# Patient Record
Sex: MALE | Race: WHITE | HISPANIC OR LATINO | Employment: FULL TIME | URBAN - METROPOLITAN AREA
[De-identification: names, ages, dates, MRNs, and addresses within clinical notes are randomized per-mention and may not be internally consistent; named-entity substitution may affect disease eponyms.]

---

## 2018-12-27 ENCOUNTER — HOSPITAL ENCOUNTER (EMERGENCY)
Facility: CLINIC | Age: 53
Discharge: HOME | End: 2018-12-27
Admitting: PHYSICIAN ASSISTANT
Payer: COMMERCIAL

## 2018-12-27 VITALS
DIASTOLIC BLOOD PRESSURE: 108 MMHG | HEART RATE: 80 BPM | RESPIRATION RATE: 20 BRPM | TEMPERATURE: 99 F | SYSTOLIC BLOOD PRESSURE: 171 MMHG | OXYGEN SATURATION: 95 %

## 2018-12-27 DIAGNOSIS — T26.92XA CHEMICAL INSULT, EYE, LEFT, INITIAL ENCOUNTER: ICD-10-CM

## 2018-12-27 PROCEDURE — 99282 EMERGENCY DEPT VISIT SF MDM: CPT

## 2018-12-27 ASSESSMENT — ENCOUNTER SYMPTOMS
EYE PAIN: 1
EYE REDNESS: 1

## 2018-12-27 NOTE — ED TRIAGE NOTES
Patient mistakenly put antifungal cream in eyes instead of eye drops. Pain and redness in eyes. Difficulty seeing.   ABC intact alert and no distress.

## 2018-12-27 NOTE — ED PROVIDER NOTES
History     Chief Complaint:  Eye Problem    HPI   Jefry Alcantar is a 53 year old male with no reported past medical history, who presents with left eye irritation and redness. The patient reports that about 40 minutes prior to arrival he mistakenly placed antifungal drops into his left eye rather than eye drops. His left eye has since become visibly red and irritated and he reports that this is somewhat uncomfortable. He denies changes in vision or visual disturbance. No eye discharge. The patient is not a contact wearer and does does not have vision deficits at baseline.     Allergies:  No known drug allergies    Medications:    The patient is not currently taking any prescribed medications.    Past Medical History:    The patient does not have any past pertinent medical history.    Past Surgical History:    History reviewed. No pertinent surgical history.    Family History:    History reviewed. No pertinent surgical history.    Social History:  The patient was accompanied to the ED by his wife.  Smoking Status: Not on file  Smokeless Tobacco: Not on file  Alcohol Use: Not on file  Marital Status:   [2]     Review of Systems   Eyes: Positive for pain and redness. Negative for visual disturbance.   All other systems reviewed and are negative.    Physical Exam     Patient Vitals for the past 24 hrs:   BP Temp Temp src Pulse Resp SpO2   12/27/18 1308 (!) 171/108 99  F (37.2  C) Temporal 80 20 95 %   12/27/18 1306 (!) 171/108 99  F (37.2  C) Temporal 92 20 95 %     Physical Exam  General: Alert and cooperative with exam. Resting comfortably on gurney  Head:  Scalp is NC/AT  Eyes:  No scleral icterus, PERRL. EOMI.  Left conjunctiva erythematous with clear tearing. PH 7-8 before and after irrigation.   Right eye: 20/40 acuity. Left eye 20/40 acuity. Both eyes: 20/30 acuity.  ENT:  The external nose and ears are normal.   CV:  Regular rate and rhythm    No pathologic murmur, rubs, or  gallops.  Resp:  Breath sounds are clear bilaterally.  No crackles, wheezes, rhonchi.    Non-labored, no retractions or accessory muscle use  Skin:  Warm and dry, No rash or lesions noted.  Neuro: Oriented x 3. No gross motor deficits. Gait normal  Psych: Awake. Alert. Normal affect. Appropriate interactions.    Emergency Department Course     Interventions:  Left eye irrigated with 1 L of NS     Emergency Department Course:  Nursing notes and vitals reviewed.    1330: I performed an exam of the patient as documented above.     1340: Patient's eye irrigated with 1 L NS    1436: Patient rechecked and updated.     Findings and plan explained to the Patient. Patient discharged home with instructions regarding supportive care, medications, and reasons to return. The importance of close follow-up was reviewed.     Impression & Plan      Medical Decision Makin-year-old male presents after accidentally placing fungal cream in his left eye.  Patient history and records reviewed.  On examination, the patient is well-appearing.  His eye examination is significant for injection of the left conjunctiva.  Visual acuity is normal.  PH was checked and not significantly ascitic or basic.  The eye was thoroughly irrigated with a Jamel lens after being anesthetized with topical tetracaine.  The patient feels significantly improved and will be discharged home at this time.  Discussed indications to return to ED if new or worsening symptoms.  Follow-up with primary care provider as needed.    Diagnosis:    ICD-10-CM    1. Chemical insult, eye, left, initial encounterAcute T26.92XA        Disposition:  discharged to home    Petty Teran  2018   [facility] EMERGENCY DEPARTMENT  IPetty, ashley serving as a scribe at 1:30 PM on 2018 to document services personally performed by Idris Chkaraborty PA-C based on my observations and the provider's statements to me.         Idris Chakraborty,  YARI  12/27/18 3515

## 2018-12-27 NOTE — ED NOTES
Patient discharged to home. Patient received follow-up as needed in PCP. Patient received discharge instructions and has no other questions at this time.

## 2018-12-27 NOTE — DISCHARGE INSTRUCTIONS
Eye Exposure, Chemical  A chemical exposure, or injury, to the eye can occur when an acid or base solution comes in contact with your eye. As soon as the chemical gets in your eye, it is very important to flush (irrigate) your eye with sterile saline solution right away. If you don't have sterile saline, it is OK to use tap water instead.  After you flush the eye, it is important to follow up with a healthcare provider if you have blurred vision or pain.  Chemical exposure can cause anything from mild irritation to long-lasting (permanent) scarring and vision loss. How serious the injury is depends on:    What type of chemical it was    How concentrated the chemical was    Whether the chemical was an acid or a base    How long the chemical was in your eye  It is common to have some irritation for the next 24 hours, even in mild cases. If the exposure was more serious, be sure to follow up as directed.  The pressure inside of the eye can increase (called glaucoma) hours to days after a chemical eye injury. This requires prompt treatment. Watch for the symptoms described below.  Home care    You can put a cold pack over the eye for 20 minutes at a time. This will reduce pain. To make a cold pack, put ice cubes in a plastic bag that seals at the top. Wrap the bag in a clean, thin towel or cloth.    Eye drops may be prescribed to reduce irritation, inflammation, and risk of infection. If no drops were prescribed, you may use over-the-counter decongestant eye drops for irritation or redness.    You may use acetaminophen or ibuprofen to control pain, unless another medicine was prescribed. If you have chronic liver or kidney disease or have ever had a stomach ulcer or gastrointestinal bleeding, talk with your healthcare provider before using these medicines.  If an eye patch was put on:    You may put the cold pack over the eye patch.    If you were given a follow-up appointment to have the patch removed and the eye  examined, don't miss it. An eye patch should not be left in place for more than 48 hours, unless you are advised to do so by your healthcare provider.    Don't drive a motor vehicle or use machinery with the eye patch in place. It is difficult to  distance with only one eye.  Follow-up care  Follow up with your healthcare provider, or as directed.  When to seek medical advice  Call your healthcare provider right away if any of these occur:    Increased eyelid swelling    Increasing pain in the eye    Increasing redness or drainage from the eye    Normal vision doesn't come back within 24 to 48 hours    Continued feeling that something is in your eye, lasting more than 48 hours  Date Last Reviewed: 7/1/2017 2000-2018 [employer]. [address] [place] [address], [place] [address]. All rights reserved. This information is not intended as a substitute for professional medical care. Always follow your healthcare professional's instructions.

## 2018-12-27 NOTE — ED AVS SNAPSHOT
[facility] Emergency Department  [address]  [facility] [address]  Phone:  [phone]  Fax:  [phone]                                    Jefry Alcantar   MRN: [id number]    Department:  [facility] Emergency Department   Date of Visit:  12/27/2018           After Visit Summary Signature Page    I have received my discharge instructions, and my questions have been answered. I have discussed any challenges I see with this plan with the nurse or doctor.    ..........................................................................................................................................  Patient/Patient Representative Signature      ..........................................................................................................................................  Patient Representative Print Name and Relationship to Patient    ..................................................               ................................................  Date                                   Time    ..........................................................................................................................................  Reviewed by Signature/Title    ...................................................              ..............................................  Date                                               Time          22EPIC Rev 08/18

## 2023-06-14 ENCOUNTER — HOSPITAL ENCOUNTER (EMERGENCY)
Facility: CLINIC | Age: 58
Discharge: HOME | End: 2023-06-15
Attending: EMERGENCY MEDICINE | Admitting: EMERGENCY MEDICINE

## 2023-06-14 ENCOUNTER — APPOINTMENT (OUTPATIENT)
Dept: ULTRASOUND IMAGING | Facility: CLINIC | Age: 58
End: 2023-06-14
Attending: EMERGENCY MEDICINE

## 2023-06-14 DIAGNOSIS — K85.20 ALCOHOL-INDUCED ACUTE PANCREATITIS WITHOUT INFECTION OR NECROSIS: ICD-10-CM

## 2023-06-14 DIAGNOSIS — F10.10 ALCOHOL ABUSE: ICD-10-CM

## 2023-06-14 LAB
ALBUMIN SERPL BCG-MCNC: 4.2 G/DL (ref 3.5–5.2)
ALP SERPL-CCNC: 133 U/L (ref 40–129)
ALT SERPL W P-5'-P-CCNC: 101 U/L (ref 0–70)
ANION GAP SERPL CALCULATED.3IONS-SCNC: 14 MMOL/L (ref 7–15)
AST SERPL W P-5'-P-CCNC: 106 U/L (ref 0–45)
BASOPHILS # BLD AUTO: 0 10E3/UL (ref 0–0.2)
BASOPHILS NFR BLD AUTO: 0 %
BILIRUB SERPL-MCNC: 1.3 MG/DL
BUN SERPL-MCNC: 12.1 MG/DL (ref 6–20)
CALCIUM SERPL-MCNC: 8.5 MG/DL (ref 8.6–10)
CHLORIDE SERPL-SCNC: 93 MMOL/L (ref 98–107)
CREAT SERPL-MCNC: 0.62 MG/DL (ref 0.67–1.17)
DEPRECATED HCO3 PLAS-SCNC: 23 MMOL/L (ref 22–29)
EOSINOPHIL # BLD AUTO: 0.1 10E3/UL (ref 0–0.7)
EOSINOPHIL NFR BLD AUTO: 1 %
ERYTHROCYTE [DISTWIDTH] IN BLOOD BY AUTOMATED COUNT: 12.7 % (ref 10–15)
GFR SERPL CREATININE-BSD FRML MDRD: >90 ML/MIN/1.73M2
GLUCOSE SERPL-MCNC: 309 MG/DL (ref 70–99)
HCT VFR BLD AUTO: 46.9 % (ref 40–53)
HGB BLD-MCNC: 16.3 G/DL (ref 13.3–17.7)
HOLD SPECIMEN: NORMAL
HOLD SPECIMEN: NORMAL
IMM GRANULOCYTES # BLD: 0 10E3/UL
IMM GRANULOCYTES NFR BLD: 0 %
LIPASE SERPL-CCNC: 222 U/L (ref 13–60)
LYMPHOCYTES # BLD AUTO: 0.5 10E3/UL (ref 0.8–5.3)
LYMPHOCYTES NFR BLD AUTO: 7 %
MCH RBC QN AUTO: 32.8 PG (ref 26.5–33)
MCHC RBC AUTO-ENTMCNC: 34.8 G/DL (ref 31.5–36.5)
MCV RBC AUTO: 94 FL (ref 78–100)
MONOCYTES # BLD AUTO: 0.5 10E3/UL (ref 0–1.3)
MONOCYTES NFR BLD AUTO: 7 %
NEUTROPHILS # BLD AUTO: 6.1 10E3/UL (ref 1.6–8.3)
NEUTROPHILS NFR BLD AUTO: 85 %
NRBC # BLD AUTO: 0 10E3/UL
NRBC BLD AUTO-RTO: 0 /100
PLATELET # BLD AUTO: 108 10E3/UL (ref 150–450)
POTASSIUM SERPL-SCNC: 3.7 MMOL/L (ref 3.4–5.3)
PROT SERPL-MCNC: 8.4 G/DL (ref 6.4–8.3)
RBC # BLD AUTO: 4.97 10E6/UL (ref 4.4–5.9)
SODIUM SERPL-SCNC: 130 MMOL/L (ref 136–145)
TROPONIN T SERPL HS-MCNC: 9 NG/L
WBC # BLD AUTO: 7.2 10E3/UL (ref 4–11)

## 2023-06-14 PROCEDURE — 258N000003 HC RX IP 258 OP 636: Performed by: EMERGENCY MEDICINE

## 2023-06-14 PROCEDURE — 96361 HYDRATE IV INFUSION ADD-ON: CPT

## 2023-06-14 PROCEDURE — 96375 TX/PRO/DX INJ NEW DRUG ADDON: CPT

## 2023-06-14 PROCEDURE — 99285 EMERGENCY DEPT VISIT HI MDM: CPT | Mod: 25

## 2023-06-14 PROCEDURE — 250N000013 HC RX MED GY IP 250 OP 250 PS 637: Performed by: EMERGENCY MEDICINE

## 2023-06-14 PROCEDURE — 96374 THER/PROPH/DIAG INJ IV PUSH: CPT

## 2023-06-14 PROCEDURE — 76705 ECHO EXAM OF ABDOMEN: CPT

## 2023-06-14 PROCEDURE — 36415 COLL VENOUS BLD VENIPUNCTURE: CPT | Performed by: EMERGENCY MEDICINE

## 2023-06-14 PROCEDURE — 85025 COMPLETE CBC W/AUTO DIFF WBC: CPT | Performed by: EMERGENCY MEDICINE

## 2023-06-14 PROCEDURE — 250N000011 HC RX IP 250 OP 636: Performed by: EMERGENCY MEDICINE

## 2023-06-14 PROCEDURE — 84484 ASSAY OF TROPONIN QUANT: CPT | Performed by: EMERGENCY MEDICINE

## 2023-06-14 PROCEDURE — 80053 COMPREHEN METABOLIC PANEL: CPT | Performed by: EMERGENCY MEDICINE

## 2023-06-14 PROCEDURE — 93005 ELECTROCARDIOGRAM TRACING: CPT

## 2023-06-14 PROCEDURE — 83690 ASSAY OF LIPASE: CPT | Performed by: EMERGENCY MEDICINE

## 2023-06-14 RX ORDER — ONDANSETRON 2 MG/ML
4 INJECTION INTRAMUSCULAR; INTRAVENOUS ONCE
Status: COMPLETED | OUTPATIENT
Start: 2023-06-14 | End: 2023-06-14

## 2023-06-14 RX ORDER — MAGNESIUM HYDROXIDE/ALUMINUM HYDROXICE/SIMETHICONE 120; 1200; 1200 MG/30ML; MG/30ML; MG/30ML
20 SUSPENSION ORAL ONCE
Status: COMPLETED | OUTPATIENT
Start: 2023-06-14 | End: 2023-06-14

## 2023-06-14 RX ADMIN — FAMOTIDINE 20 MG: 10 INJECTION, SOLUTION INTRAVENOUS at 22:58

## 2023-06-14 RX ADMIN — ALUMINUM HYDROXIDE, MAGNESIUM HYDROXIDE, AND DIMETHICONE 20 ML: 200; 20; 200 SUSPENSION ORAL at 22:58

## 2023-06-14 RX ADMIN — ONDANSETRON 4 MG: 2 INJECTION INTRAMUSCULAR; INTRAVENOUS at 22:24

## 2023-06-14 RX ADMIN — SODIUM CHLORIDE 1000 ML: 9 INJECTION, SOLUTION INTRAVENOUS at 22:24

## 2023-06-14 ASSESSMENT — ACTIVITIES OF DAILY LIVING (ADL): ADLS_ACUITY_SCORE: 35

## 2023-06-15 ENCOUNTER — NURSE TRIAGE (OUTPATIENT)
Dept: NURSING | Facility: CLINIC | Age: 58
End: 2023-06-15

## 2023-06-15 VITALS
OXYGEN SATURATION: 92 % | TEMPERATURE: 98.3 F | BODY MASS INDEX: 35 KG/M2 | DIASTOLIC BLOOD PRESSURE: 99 MMHG | HEART RATE: 90 BPM | WEIGHT: 250 LBS | SYSTOLIC BLOOD PRESSURE: 152 MMHG | HEIGHT: 71 IN | RESPIRATION RATE: 18 BRPM

## 2023-06-15 LAB
ATRIAL RATE - MUSE: 96 BPM
DIASTOLIC BLOOD PRESSURE - MUSE: NORMAL MMHG
INTERPRETATION ECG - MUSE: NORMAL
P AXIS - MUSE: 9 DEGREES
PR INTERVAL - MUSE: 166 MS
QRS DURATION - MUSE: 84 MS
QT - MUSE: 362 MS
QTC - MUSE: 457 MS
R AXIS - MUSE: -36 DEGREES
SYSTOLIC BLOOD PRESSURE - MUSE: NORMAL MMHG
T AXIS - MUSE: 19 DEGREES
VENTRICULAR RATE- MUSE: 96 BPM

## 2023-06-15 PROCEDURE — 96361 HYDRATE IV INFUSION ADD-ON: CPT

## 2023-06-15 PROCEDURE — 250N000011 HC RX IP 250 OP 636: Performed by: EMERGENCY MEDICINE

## 2023-06-15 RX ORDER — ONDANSETRON 4 MG/1
4 TABLET, ORALLY DISINTEGRATING ORAL EVERY 8 HOURS PRN
Qty: 15 TABLET | Refills: 0 | Status: SHIPPED | OUTPATIENT
Start: 2023-06-15

## 2023-06-15 RX ORDER — ONDANSETRON 4 MG/1
4 TABLET, ORALLY DISINTEGRATING ORAL EVERY 8 HOURS PRN
Qty: 15 TABLET | Refills: 0 | Status: SHIPPED | OUTPATIENT
Start: 2023-06-15 | End: 2023-06-15

## 2023-06-15 RX ORDER — HYDROMORPHONE HYDROCHLORIDE 1 MG/ML
0.5 INJECTION, SOLUTION INTRAMUSCULAR; INTRAVENOUS; SUBCUTANEOUS ONCE
Status: COMPLETED | OUTPATIENT
Start: 2023-06-15 | End: 2023-06-15

## 2023-06-15 RX ORDER — HYDROCODONE BITARTRATE AND ACETAMINOPHEN 5; 325 MG/1; MG/1
2 TABLET ORAL ONCE
Status: DISCONTINUED | OUTPATIENT
Start: 2023-06-15 | End: 2023-06-15

## 2023-06-15 RX ORDER — OXYCODONE AND ACETAMINOPHEN 5; 325 MG/1; MG/1
1 TABLET ORAL EVERY 6 HOURS PRN
Qty: 12 TABLET | Refills: 0 | Status: SHIPPED | OUTPATIENT
Start: 2023-06-15 | End: 2023-06-18

## 2023-06-15 RX ORDER — FAMOTIDINE 10 MG
10 TABLET ORAL 2 TIMES DAILY
Qty: 30 TABLET | Refills: 0 | Status: SHIPPED | OUTPATIENT
Start: 2023-06-15

## 2023-06-15 RX ADMIN — HYDROMORPHONE HYDROCHLORIDE 0.5 MG: 1 INJECTION, SOLUTION INTRAMUSCULAR; INTRAVENOUS; SUBCUTANEOUS at 00:44

## 2023-06-15 ASSESSMENT — ACTIVITIES OF DAILY LIVING (ADL): ADLS_ACUITY_SCORE: 35

## 2023-06-15 NOTE — TELEPHONE ENCOUNTER
Nurse Triage SBAR    Situation:   -medication question    Background:   -daughter calling, It is okay to leave a detailed message at this number.    -she is with the patient  -patient gave verbal consent to communicate  -they declined an interpretor and wish daughter to act as an interpretor    Assessment:   -seen in ED 6/14/23  -got home this morning at 3 am  diarrhea started after leaving the ED  -he is having about 10 episodes of diarrhea an hours  -no other symptoms changed since leaving the ED  -abdominal pain is 10+/10  -he took an oxycodone 15 min ago, we discussed that it will take 30-10 min of this to start working    Recommendation:   Go to ED Now  -then plan to wait and see if the oxy helps, if not will go back to the ED    NIGEL DAVISON RN on 6/15/2023 at 12:01 PM     Reason for Disposition    SEVERE abdominal pain (e.g., excruciating) and present > 1 hour    Additional Information    Negative: Vomiting also present and worse than the diarrhea    Negative: Blood in stool and without diarrhea    Negative: Shock suspected (e.g., cold/pale/clammy skin, too weak to stand, low BP, rapid pulse)    Negative: Difficult to awaken or acting confused (e.g., disoriented, slurred speech)    Negative: Sounds like a life-threatening emergency to the triager    Protocols used: DIARRHEA-A-OH

## 2023-06-15 NOTE — ED TRIAGE NOTES
Pt. Presents to ED with complaints of epigastric pain, nausea, and vomiting since yesterday. Denies chest pain but reports SOB. Denies diarrhea, reports normal bowel movement just before arriving. Reports muscle pain/joint aches since vomiting. Vomited 1 hour PTA. HTN, OVSS on RA. A & O x 4, independent. Denies medical conditions/daily meds. Pt. Appears diaphoretic in triage. Pt. Unable to keep anything down.

## 2023-06-15 NOTE — ED PROVIDER NOTES
"  History     Chief Complaint:  Abdominal Pain and Nausea & Vomiting     The history is provided by the patient and a relative (patient's daughter translated for patient). The history is limited by a language barrier. No  was used (Brazilian).      Jefry Alcantar is a 58 year old male who presents to the ED for evaluation of epigastric abdominal pain, nausea, and vomiting. Patient's daughter reports abdominal pain near his chest starting yesterday, feeling like pulses and pressure. Patient reports deep breathing increase his pain. Patient reports vomiting twice. Patient reports drinking around 10 beers per day. He denies back pain or urinary/bowel symptoms. Patient denies blood in vomit. Patient denies pain being exacerbated by eating or exertion. Patient reports pain currently. He denies issue breathing, abdominal surgery. He reports that this is the first time he has had this pain. No fevers. No urinary symptoms. No changes to his bowel movments. He denies any other symptoms.       Independent Historian:   Daughter supplemented patient history and also translated for patient.    Review of External Notes:  None    ROS:  See HPI    Allergies:  No known drug allergies     Physical Exam     Patient Vitals for the past 24 hrs:   BP Temp Temp src Pulse Resp SpO2 Height Weight   06/14/23 2211 (!) 169/106 -- -- 102 18 96 % -- --   06/14/23 2207 -- 98.3  F (36.8  C) Temporal -- -- -- 1.803 m (5' 11\") 113.4 kg (250 lb)      Physical Exam  General: Well appearing, nontoxic. Resting comfortably  Head:  Scalp, face, and head appear normal  Eyes:  Pupils are equal, round    Conjunctivae non-injected and sclerae white  ENT:    The external nose is normal    Pinnae are normal  Neck:  Normal range of motion    There is no rigidity noted    Trachea is in the midline  CV:  Regular rate and rhythm     Normal S1/S2, no S3/S4    No murmur or rub. Radial pulses 2+ bilaterally.  Resp:  Lungs are clear and equal " bilaterally  There is no tachypnea    No increased work of breathing    No rales, wheezing, or rhonchi  GI:  Abdomen is soft, no rigidity or guarding    No distension, or mass    Mild epigastric tenderness. No rebound tenderness. Neg murphys sign.  MS:  Normal muscular tone    Symmetric motor strength    No lower extremity edema  Skin:  No rash or acute skin lesions noted  Neuro: Awake and alert  Speech is normal and fluent  Moves all extremities spontaneously  Psych:  Normal affect. Appropriate interactions.      Emergency Department Course     ECG results from 06/14/23   EKG 12-lead, tracing only     Value    Systolic Blood Pressure     Diastolic Blood Pressure     Ventricular Rate 96    Atrial Rate 96    NM Interval 166    QRS Duration 84        QTc 457    P Axis 9    R AXIS -36    T Axis 19    Interpretation ECG      Sinus rhythm  Left axis deviation  Abnormal ECG  No previous ECGs available       Imaging:  US Abdomen Limited (RUQ)   Final Result   IMPRESSION:      Hepatomegaly and diffuse hepatic steatosis.         Report per radiology    Laboratory:  Labs Ordered and Resulted from Time of ED Arrival to Time of ED Departure   COMPREHENSIVE METABOLIC PANEL - Abnormal       Result Value    Sodium 130 (*)     Potassium 3.7      Chloride 93 (*)     Carbon Dioxide (CO2) 23      Anion Gap 14      Urea Nitrogen 12.1      Creatinine 0.62 (*)     Calcium 8.5 (*)     Glucose 309 (*)     Alkaline Phosphatase 133 (*)      (*)      (*)     Protein Total 8.4 (*)     Albumin 4.2      Bilirubin Total 1.3 (*)     GFR Estimate >90     LIPASE - Abnormal    Lipase 222 (*)    CBC WITH PLATELETS AND DIFFERENTIAL - Abnormal    WBC Count 7.2      RBC Count 4.97      Hemoglobin 16.3      Hematocrit 46.9      MCV 94      MCH 32.8      MCHC 34.8      RDW 12.7      Platelet Count 108 (*)     % Neutrophils 85      % Lymphocytes 7      % Monocytes 7      % Eosinophils 1      % Basophils 0      % Immature Granulocytes 0       NRBCs per 100 WBC 0      Absolute Neutrophils 6.1      Absolute Lymphocytes 0.5 (*)     Absolute Monocytes 0.5      Absolute Eosinophils 0.1      Absolute Basophils 0.0      Absolute Immature Granulocytes 0.0      Absolute NRBCs 0.0     TROPONIN T, HIGH SENSITIVITY - Normal    Troponin T, High Sensitivity 9        Procedures     Emergency Department Course & Assessments:     Interventions:  Medications   0.9% sodium chloride BOLUS (1,000 mLs Intravenous $New Bag 6/14/23 2224)   alum & mag hydroxide-simethicone (MAALOX) suspension 20 mL (has no administration in time range)   famotidine (PEPCID) injection 20 mg (has no administration in time range)   ondansetron (ZOFRAN) injection 4 mg (4 mg Intravenous $Given 6/14/23 2224)      Assessments, Independent Interpretation, Consult/Discussion of ManagementTests:  ED Course as of 06/14/23 2258 Wed Jun 14, 2023 2248 I obtained history and examined the patient as noted above.      Social Determinants of Health affecting care:  None    Disposition:  The patient was discharged to home.     Impression & Plan      Medical Decision Making:  Jefry Alcantar is a 58 year old male who presents to the ED for evaluation of epigastric pain. On my evaluation the patient is well appearing, hemodynamically stable and afebrile. Abd exam with moderate epigastric tenderness to palpation but otherwise nonperitoneal and benign. Broad ddx considered. ED evaluation is consistent with alcohol induced hepatosteatosis and mild acute pancreatitis. No gallstones, cholecystitis, choledocholithiasis. No findings to suggest cardiac etiology. No evidence of acute infectious process. LFTs and Lipase mildly elevated. I recommended long term cessation of alcohol use, bowel rest, and liquid diet until symptoms improved. Supportive care with analgesics, antiemetics, and famotidine. I discussed with patient that his alcohol use is causing damage to his liver and this may certainly progress if he  continues to drink dangerous amounts of alcohol. Patient denies having alcohol withdrawal symptoms if he stops drinking. Close follow up with PCP and/or GI recommended. Return precautions were discussed with patient. The patient's questions were answered and the patient was agreeable with discharge.     Diagnosis:    ICD-10-CM    1. Alcohol-induced acute pancreatitis without infection or necrosis  K85.20       2. Alcohol abuse  F10.10            Discharge Medications:  Discharge Medication List as of 6/15/2023  1:02 AM      START taking these medications    Details   famotidine (PEPCID) 10 MG tablet Take 1 tablet (10 mg) by mouth 2 times daily, Disp-30 tablet, R-0, E-Prescribe      oxyCODONE-acetaminophen (PERCOCET) 5-325 MG tablet Take 1 tablet by mouth every 6 hours as needed for pain, Disp-12 tablet, R-0, E-Prescribe            Sheri VIDALES, ashley serving as a scribe at 10:55 PM on 6/14/2023 to document services personally performed by Dr. Nicole, based on my observations and the provider's statements to me.     6/14/2023   Taj Nicole MD      Historical Data:  ______________________________________________________________________  Medications:    The patient is not currently taking any prescribed medications.     Past Medical History:   Past Surgical History:     No pertinent past medical history  No past surgical history on file.        Taj Nicole MD  06/15/23 1934

## 2023-06-26 ENCOUNTER — NURSE TRIAGE (OUTPATIENT)
Dept: NURSING | Facility: CLINIC | Age: 58
End: 2023-06-26

## 2023-06-26 NOTE — TELEPHONE ENCOUNTER
Patient's daughter calling, but there is no consent on file.  Patient just fell asleep and caller doesn't want to wake him.    Caller and patient will call back when he is awake and can give verbal consent.    Marbella Brooks RN  [facility]

## (undated) RX ORDER — TETRACAINE HYDROCHLORIDE 5 MG/ML
SOLUTION OPHTHALMIC
Status: DISPENSED
Start: 2018-12-27